# Patient Record
Sex: FEMALE | URBAN - METROPOLITAN AREA
[De-identification: names, ages, dates, MRNs, and addresses within clinical notes are randomized per-mention and may not be internally consistent; named-entity substitution may affect disease eponyms.]

---

## 2021-09-08 ENCOUNTER — APPOINTMENT (OUTPATIENT)
Age: 31
Setting detail: DERMATOLOGY
End: 2021-09-08

## 2021-09-08 DIAGNOSIS — L20.89 OTHER ATOPIC DERMATITIS: ICD-10-CM

## 2021-09-08 DIAGNOSIS — Z71.89 OTHER SPECIFIED COUNSELING: ICD-10-CM

## 2021-09-08 DIAGNOSIS — L85.3 XEROSIS CUTIS: ICD-10-CM

## 2021-09-08 DIAGNOSIS — L50.8 OTHER URTICARIA: ICD-10-CM

## 2021-09-08 PROBLEM — L20.84 INTRINSIC (ALLERGIC) ECZEMA: Status: ACTIVE | Noted: 2021-09-08

## 2021-09-08 PROCEDURE — OTHER RECOMMENDATIONS: OTHER

## 2021-09-08 PROCEDURE — OTHER ITCH-SCRATCH CYCLE COUNSELING: OTHER

## 2021-09-08 PROCEDURE — OTHER MEDICATION COUNSELING: OTHER

## 2021-09-08 PROCEDURE — OTHER SUNSCREEN RECOMMENDATIONS: OTHER

## 2021-09-08 PROCEDURE — OTHER ADDITIONAL NOTES: OTHER

## 2021-09-08 PROCEDURE — OTHER PRESCRIPTION: OTHER

## 2021-09-08 PROCEDURE — OTHER MIPS QUALITY: OTHER

## 2021-09-08 PROCEDURE — OTHER COUNSELING: OTHER

## 2021-09-08 PROCEDURE — OTHER TREATMENT REGIMEN: OTHER

## 2021-09-08 PROCEDURE — 99204 OFFICE O/P NEW MOD 45 MIN: CPT

## 2021-09-08 RX ORDER — TRIAMCINOLONE ACETONIDE 0.25 MG/G
OINTMENT TOPICAL
Qty: 15 | Refills: 0 | Status: ERX | COMMUNITY
Start: 2021-09-08

## 2021-09-08 RX ORDER — PREDNISONE 10 MG/1
TABLET ORAL
Qty: 65 | Refills: 0 | Status: ERX | COMMUNITY
Start: 2021-09-08

## 2021-09-08 RX ORDER — PIMECROLIMUS 10 MG/G
CREAM TOPICAL
Qty: 30 | Refills: 0 | Status: ERX | COMMUNITY
Start: 2021-09-08

## 2021-09-08 RX ORDER — CLOBETASOL PROPIONATE 0.5 MG/G
CREAM TOPICAL
Qty: 45 | Refills: 1 | Status: ERX | COMMUNITY
Start: 2021-09-08

## 2021-09-08 ASSESSMENT — LOCATION ZONE DERM
LOCATION ZONE: ARM
LOCATION ZONE: FACE

## 2021-09-08 ASSESSMENT — LOCATION DETAILED DESCRIPTION DERM
LOCATION DETAILED: INFERIOR MID FOREHEAD
LOCATION DETAILED: RIGHT INFERIOR CENTRAL MALAR CHEEK
LOCATION DETAILED: RIGHT VENTRAL DISTAL FOREARM
LOCATION DETAILED: LEFT VENTRAL DISTAL FOREARM
LOCATION DETAILED: LEFT INFERIOR CENTRAL MALAR CHEEK

## 2021-09-08 ASSESSMENT — LOCATION SIMPLE DESCRIPTION DERM
LOCATION SIMPLE: RIGHT CHEEK
LOCATION SIMPLE: INFERIOR FOREHEAD
LOCATION SIMPLE: LEFT FOREARM
LOCATION SIMPLE: LEFT CHEEK
LOCATION SIMPLE: RIGHT FOREARM

## 2021-09-08 ASSESSMENT — SEVERITY ASSESSMENT 2020: SEVERITY 2020: MODERATE

## 2021-09-08 NOTE — PROCEDURE: TREATMENT REGIMEN
Detail Level: Zone
Initiate Treatment: Elidel, Prednisone, Clobetasol (hands)
Continue Regimen: Triamcinolone (arms/back)

## 2021-09-08 NOTE — PROCEDURE: MIPS QUALITY
Detail Level: Detailed
Quality 110: Preventive Care And Screening: Influenza Immunization: Influenza Immunization Administered during Influenza season
Quality 226: Preventive Care And Screening: Tobacco Use: Screening And Cessation Intervention: Patient screened for tobacco use and is an ex/non-smoker
Quality 130: Documentation Of Current Medications In The Medical Record: Current Medications Documented
Quality 431: Preventive Care And Screening: Unhealthy Alcohol Use - Screening: Patient not identified as an unhealthy alcohol user when screened for unhealthy alcohol use using a systematic screening method

## 2021-09-08 NOTE — PROCEDURE: ADDITIONAL NOTES
Render Risk Assessment In Note?: no
Additional Notes: 9/8/2021-suspect eczema with urticaria secondary to allergen trigger, encouraged pt on getting allergy testing (gave her names of a few local allergist), ? Irritant contact derm on face secondary to burning of poison ivy vs unknown trigger. Advised that pigment changes are due to inflammation in skin and application of topical steroid especially with chronic sun exposure (reports she walks daily). Advised on the importance of sunscreen daily-zinc based only, only free and clear products such as vanicream, antihistamine daily. Advised if she gets worse or she develops difficulty breathing she needs to go to ER stat.
Detail Level: Simple
Additional Notes: Advised patient to get a referral to allergy for allergy testing

## 2021-09-08 NOTE — HPI: SECONDARY COMPLAINT
How Severe Is This Condition?: moderate
Additional History: Patient presents today for rash that’s been present almost 1 month. Patient has a hx of eczema for several years (reports no known allergens-never had allergy testing). Patient states her face became red, itchy, flaky with swelling to her eyes. She reports she was seen at an urgent care and was given topical steroid (she reports she doesn’t use because it doesn’t work) and 6 days of oral steroids. Patient states nothing seems to be helping and it seems to be getting worse. Patient reports no new products, medications or recent illnesses.

## 2021-09-08 NOTE — PROCEDURE: RECOMMENDATIONS
Render Risk Assessment In Note?: no
Detail Level: Zone
Recommendation Override: Vanicream only, free and clear products
Recommendation Preamble: The following recommendations were made during the visit: Aquaphor, eucerin advanced repair, vanicream

## 2021-09-08 NOTE — PROCEDURE: MEDICATION COUNSELING
Xelbolivarz Pregnancy And Lactation Text: This medication is Pregnancy Category D and is not considered safe during pregnancy.  The risk during breast feeding is also uncertain.

## 2021-10-27 ENCOUNTER — APPOINTMENT (OUTPATIENT)
Age: 31
Setting detail: DERMATOLOGY
End: 2021-10-27

## 2021-10-27 DIAGNOSIS — L50.8 OTHER URTICARIA: ICD-10-CM

## 2021-10-27 DIAGNOSIS — Z71.89 OTHER SPECIFIED COUNSELING: ICD-10-CM

## 2021-10-27 DIAGNOSIS — L20.89 OTHER ATOPIC DERMATITIS: ICD-10-CM

## 2021-10-27 DIAGNOSIS — L85.3 XEROSIS CUTIS: ICD-10-CM

## 2021-10-27 DIAGNOSIS — L30.1 DYSHIDROSIS [POMPHOLYX]: ICD-10-CM

## 2021-10-27 PROBLEM — L20.84 INTRINSIC (ALLERGIC) ECZEMA: Status: ACTIVE | Noted: 2021-10-27

## 2021-10-27 PROCEDURE — OTHER COUNSELING: OTHER

## 2021-10-27 PROCEDURE — OTHER ADDITIONAL NOTES: OTHER

## 2021-10-27 PROCEDURE — OTHER MIPS QUALITY: OTHER

## 2021-10-27 PROCEDURE — 99213 OFFICE O/P EST LOW 20 MIN: CPT

## 2021-10-27 PROCEDURE — OTHER TREATMENT REGIMEN: OTHER

## 2021-10-27 PROCEDURE — OTHER MEDICATION COUNSELING: OTHER

## 2021-10-27 PROCEDURE — OTHER RECOMMENDATIONS: OTHER

## 2021-10-27 PROCEDURE — OTHER ITCH-SCRATCH CYCLE COUNSELING: OTHER

## 2021-10-27 PROCEDURE — OTHER SUNSCREEN RECOMMENDATIONS: OTHER

## 2021-10-27 ASSESSMENT — LOCATION SIMPLE DESCRIPTION DERM
LOCATION SIMPLE: INFERIOR FOREHEAD
LOCATION SIMPLE: RIGHT CHEEK
LOCATION SIMPLE: RIGHT FOREARM
LOCATION SIMPLE: RIGHT HAND
LOCATION SIMPLE: LEFT CHEEK
LOCATION SIMPLE: LEFT FOREARM
LOCATION SIMPLE: LEFT HAND

## 2021-10-27 ASSESSMENT — LOCATION ZONE DERM
LOCATION ZONE: FACE
LOCATION ZONE: ARM
LOCATION ZONE: HAND

## 2021-10-27 ASSESSMENT — SEVERITY ASSESSMENT 2020: SEVERITY 2020: ALMOST CLEAR

## 2021-10-27 ASSESSMENT — LOCATION DETAILED DESCRIPTION DERM
LOCATION DETAILED: LEFT VENTRAL DISTAL FOREARM
LOCATION DETAILED: RIGHT VENTRAL DISTAL FOREARM
LOCATION DETAILED: LEFT THENAR EMINENCE
LOCATION DETAILED: LEFT INFERIOR CENTRAL MALAR CHEEK
LOCATION DETAILED: RIGHT INFERIOR CENTRAL MALAR CHEEK
LOCATION DETAILED: INFERIOR MID FOREHEAD
LOCATION DETAILED: RIGHT THENAR EMINENCE

## 2021-10-27 NOTE — PROCEDURE: ADDITIONAL NOTES
Detail Level: Simple
Render Risk Assessment In Note?: no
Additional Notes: Advised patient to get a referral to allergy for allergy testing
Additional Notes: 9/8/2021-suspect eczema with urticaria secondary to allergen trigger, encouraged pt on getting allergy testing (gave her names of a few local allergist), ? Irritant contact derm on face secondary to burning of poison ivy vs unknown trigger. Advised that pigment changes are due to inflammation in skin and application of topical steroid especially with chronic sun exposure (reports she walks daily). Advised on the importance of sunscreen daily-zinc based only, only free and clear products such as vanicream, antihistamine daily. Advised if she gets worse or she develops difficulty breathing she needs to go to ER stat.\\n\\n10/27-much improvement today, pt has been off oral prednisone for at least 2 weeks with no worse flare. Has what looks like dyshidrosis on hands with mild-moderate hyperhidrosis. Advised to apply certain Dri to palms night, wash off in AM.

## 2021-10-27 NOTE — PROCEDURE: TREATMENT REGIMEN
Continue Regimen: Triamcinolone (arms/back)\\nElidel for face\\nClobetasol (hands)
Detail Level: Zone

## 2021-10-27 NOTE — PROCEDURE: MEDICATION COUNSELING
Isotretinoin Pregnancy And Lactation Text: This medication is Pregnancy Category X and is considered extremely dangerous during pregnancy. It is unknown if it is excreted in breast milk. no

## 2021-10-27 NOTE — PROCEDURE: MEDICATION COUNSELING
No complaints Colchicine Counseling:  Patient counseled regarding adverse effects including but not limited to stomach upset (nausea, vomiting, stomach pain, or diarrhea).  Patient instructed to limit alcohol consumption while taking this medication.  Colchicine may reduce blood counts especially with prolonged use.  The patient understands that monitoring of kidney function and blood counts may be required, especially at baseline. The patient verbalized understanding of the proper use and possible adverse effects of colchicine.  All of the patient's questions and concerns were addressed.

## 2021-10-27 NOTE — PROCEDURE: MIPS QUALITY
Quality 110: Preventive Care And Screening: Influenza Immunization: Influenza Immunization Administered during Influenza season
Quality 431: Preventive Care And Screening: Unhealthy Alcohol Use - Screening: Patient not identified as an unhealthy alcohol user when screened for unhealthy alcohol use using a systematic screening method
Detail Level: Detailed
Quality 226: Preventive Care And Screening: Tobacco Use: Screening And Cessation Intervention: Patient screened for tobacco use and is an ex/non-smoker
Quality 130: Documentation Of Current Medications In The Medical Record: Current Medications Documented

## 2022-04-27 ENCOUNTER — APPOINTMENT (OUTPATIENT)
Dept: URBAN - METROPOLITAN AREA CLINIC 279 | Age: 32
Setting detail: DERMATOLOGY
End: 2022-04-27

## 2022-04-27 DIAGNOSIS — L50.8 OTHER URTICARIA: ICD-10-CM

## 2022-04-27 DIAGNOSIS — L30.1 DYSHIDROSIS [POMPHOLYX]: ICD-10-CM

## 2022-04-27 DIAGNOSIS — L85.3 XEROSIS CUTIS: ICD-10-CM

## 2022-04-27 DIAGNOSIS — Z71.89 OTHER SPECIFIED COUNSELING: ICD-10-CM

## 2022-04-27 DIAGNOSIS — L20.89 OTHER ATOPIC DERMATITIS: ICD-10-CM

## 2022-04-27 PROBLEM — L20.84 INTRINSIC (ALLERGIC) ECZEMA: Status: ACTIVE | Noted: 2022-04-27

## 2022-04-27 PROCEDURE — OTHER TREATMENT REGIMEN: OTHER

## 2022-04-27 PROCEDURE — OTHER ITCH-SCRATCH CYCLE COUNSELING: OTHER

## 2022-04-27 PROCEDURE — OTHER SUNSCREEN RECOMMENDATIONS: OTHER

## 2022-04-27 PROCEDURE — OTHER COUNSELING: OTHER

## 2022-04-27 PROCEDURE — OTHER MIPS QUALITY: OTHER

## 2022-04-27 PROCEDURE — OTHER ADDITIONAL NOTES: OTHER

## 2022-04-27 PROCEDURE — 99213 OFFICE O/P EST LOW 20 MIN: CPT

## 2022-04-27 ASSESSMENT — LOCATION DETAILED DESCRIPTION DERM
LOCATION DETAILED: LEFT INFERIOR CENTRAL MALAR CHEEK
LOCATION DETAILED: RIGHT VENTRAL DISTAL FOREARM
LOCATION DETAILED: LEFT VENTRAL DISTAL FOREARM
LOCATION DETAILED: RIGHT INFERIOR CENTRAL MALAR CHEEK
LOCATION DETAILED: LEFT THENAR EMINENCE
LOCATION DETAILED: RIGHT THENAR EMINENCE
LOCATION DETAILED: INFERIOR MID FOREHEAD

## 2022-04-27 ASSESSMENT — LOCATION SIMPLE DESCRIPTION DERM
LOCATION SIMPLE: LEFT FOREARM
LOCATION SIMPLE: LEFT HAND
LOCATION SIMPLE: RIGHT CHEEK
LOCATION SIMPLE: RIGHT HAND
LOCATION SIMPLE: INFERIOR FOREHEAD
LOCATION SIMPLE: RIGHT FOREARM
LOCATION SIMPLE: LEFT CHEEK

## 2022-04-27 ASSESSMENT — PAIN INTENSITY VAS: HOW INTENSE IS YOUR PAIN 0 BEING NO PAIN, 10 BEING THE MOST SEVERE PAIN POSSIBLE?: NO PAIN

## 2022-04-27 ASSESSMENT — LOCATION ZONE DERM
LOCATION ZONE: FACE
LOCATION ZONE: ARM
LOCATION ZONE: HAND

## 2022-04-27 ASSESSMENT — SEVERITY ASSESSMENT: SEVERITY: CLEAR

## 2022-04-27 ASSESSMENT — SEVERITY ASSESSMENT 2020: SEVERITY 2020: ALMOST CLEAR

## 2022-04-27 NOTE — PROCEDURE: TREATMENT REGIMEN
Otc Regimen: Free and clear products
Detail Level: Zone
Continue Regimen: Triamcinolone (arms/back)\\nElidel for face\\nClobetasol (hands)

## 2022-04-27 NOTE — PROCEDURE: ADDITIONAL NOTES
Render Risk Assessment In Note?: no
Additional Notes: 9/8/2021-suspect eczema with urticaria secondary to allergen trigger, encouraged pt on getting allergy testing (gave her names of a few local allergist), ? Irritant contact derm on face secondary to burning of poison ivy vs unknown trigger. Advised that pigment changes are due to inflammation in skin and application of topical steroid especially with chronic sun exposure (reports she walks daily). Advised on the importance of sunscreen daily-zinc based only, only free and clear products such as vanicream, antihistamine daily. Advised if she gets worse or she develops difficulty breathing she needs to go to ER stat.\\n\\n10/27-much improvement today, pt has been off oral prednisone for at least 2 weeks with no worse flare. Has what looks like dyshidrosis on hands with mild-moderate hyperhidrosis. Advised to apply certain Dri to palms night, wash off in AM.\\n\\n04/27/22-continues to have improvement, still some itching to axilla, more with sweating and running, 2 small little patches on face that are almost gone today
Detail Level: Simple

## 2022-09-28 ENCOUNTER — APPOINTMENT (OUTPATIENT)
Dept: URBAN - METROPOLITAN AREA CLINIC 279 | Age: 32
Setting detail: DERMATOLOGY
End: 2022-10-06

## 2022-09-28 DIAGNOSIS — L30.1 DYSHIDROSIS [POMPHOLYX]: ICD-10-CM

## 2022-09-28 PROCEDURE — OTHER COUNSELING: OTHER

## 2022-09-28 PROCEDURE — 99213 OFFICE O/P EST LOW 20 MIN: CPT

## 2022-09-28 PROCEDURE — OTHER TREATMENT REGIMEN: OTHER

## 2022-09-28 PROCEDURE — OTHER PRESCRIPTION: OTHER

## 2022-09-28 PROCEDURE — OTHER MIPS QUALITY: OTHER

## 2022-09-28 RX ORDER — BETAMETHASONE DIPROPIONATE 0.5 MG/G
OINTMENT TOPICAL
Qty: 45 | Refills: 0 | Status: ERX | COMMUNITY
Start: 2022-09-28

## 2022-09-28 RX ORDER — KETOCONAZOLE 20 MG/G
CREAM TOPICAL
Qty: 60 | Refills: 0 | Status: ERX | COMMUNITY
Start: 2022-09-28

## 2022-09-28 ASSESSMENT — LOCATION DETAILED DESCRIPTION DERM
LOCATION DETAILED: RIGHT THENAR EMINENCE
LOCATION DETAILED: LEFT THENAR EMINENCE

## 2022-09-28 ASSESSMENT — LOCATION SIMPLE DESCRIPTION DERM
LOCATION SIMPLE: LEFT HAND
LOCATION SIMPLE: RIGHT HAND

## 2022-09-28 ASSESSMENT — LOCATION ZONE DERM: LOCATION ZONE: HAND

## 2022-09-28 NOTE — PROCEDURE: TREATMENT REGIMEN
Plan: Use fragrance free hypoallergenic emollient after hand washing. At night apply Betamethasone and cover with Nitrile gloves.
Discontinue Regimen: Clobetasol cream
Detail Level: Zone
Initiate Treatment: Betamethasone ointment and ketoconazole cream

## 2023-01-16 ENCOUNTER — RX ONLY (RX ONLY)
Age: 33
End: 2023-01-16

## 2023-01-16 ENCOUNTER — APPOINTMENT (OUTPATIENT)
Dept: URBAN - METROPOLITAN AREA CLINIC 279 | Age: 33
Setting detail: DERMATOLOGY
End: 2023-03-13

## 2023-01-16 DIAGNOSIS — L30.1 DYSHIDROSIS [POMPHOLYX]: ICD-10-CM

## 2023-01-16 PROCEDURE — OTHER COUNSELING: OTHER

## 2023-01-16 PROCEDURE — OTHER MIPS QUALITY: OTHER

## 2023-01-16 PROCEDURE — 99213 OFFICE O/P EST LOW 20 MIN: CPT

## 2023-01-16 PROCEDURE — OTHER TREATMENT REGIMEN: OTHER

## 2023-01-16 RX ORDER — BETAMETHASONE DIPROPIONATE 0.5 MG/G
OINTMENT TOPICAL
Qty: 45 | Refills: 0 | Status: ERX

## 2023-01-16 ASSESSMENT — LOCATION SIMPLE DESCRIPTION DERM
LOCATION SIMPLE: LEFT HAND
LOCATION SIMPLE: RIGHT HAND

## 2023-01-16 ASSESSMENT — LOCATION DETAILED DESCRIPTION DERM
LOCATION DETAILED: RIGHT THENAR EMINENCE
LOCATION DETAILED: LEFT THENAR EMINENCE

## 2023-01-16 ASSESSMENT — LOCATION ZONE DERM: LOCATION ZONE: HAND

## 2023-01-16 NOTE — PROCEDURE: TREATMENT REGIMEN
Detail Level: Zone
Plan: Use fragrance free hypoallergenic emollient after hand washing. At night apply Betamethasone and cover with Nitrile gloves. Pt will avoid all hand sanitizers and wipes
Discontinue Regimen: Ketoconazole cream. Pt did not use since Betamethasone improved condition
Continue Regimen: Betamethasone ointment. Pt will be more aggressive with topical application to avoid systemic treatments that can be immunosuppressive.

## 2023-02-16 ENCOUNTER — APPOINTMENT (OUTPATIENT)
Dept: URBAN - METROPOLITAN AREA CLINIC 279 | Age: 33
Setting detail: DERMATOLOGY
End: 2023-03-13

## 2023-02-16 DIAGNOSIS — L30.1 DYSHIDROSIS [POMPHOLYX]: ICD-10-CM

## 2023-02-16 PROCEDURE — OTHER ADDITIONAL NOTES: OTHER

## 2023-02-16 PROCEDURE — OTHER COUNSELING: OTHER

## 2023-02-16 PROCEDURE — 99213 OFFICE O/P EST LOW 20 MIN: CPT

## 2023-02-16 PROCEDURE — OTHER MIPS QUALITY: OTHER

## 2023-02-16 PROCEDURE — OTHER TREATMENT REGIMEN: OTHER

## 2023-02-16 NOTE — PROCEDURE: ADDITIONAL NOTES
Ximena Maddox RN 7/14/2021 7:13 AM CDT      ----- Message -----  From: Gracy Hernandez  Sent: 7/13/2021 8:08 PM CDT  To: , *  Subject: Medication Question     Hello!     I wanted to wait a couple days after I finished the course to respond, because sometimes an antibiotic I haven't tried before clears up the symptoms for a few days and then they ramp back up again, which is the case again. I'm back to having frequency and dysuria. I know it's a medication that passes through breast milk, but the last time I had a UTI, I did a course of cipro and that knocked it out. I also had to take the diflucan after the keflex course, so would it be possible to do a course of cipro and a PRN dose of diflucan?     Thank you,  Gracy    
Render Risk Assessment In Note?: no
Detail Level: Simple
Additional Notes: Patient will follow up as needed, and does not need a refill at this time.

## 2023-02-16 NOTE — PROCEDURE: TREATMENT REGIMEN
Detail Level: Zone
Continue Regimen: Betamethasone ointment
Otc Regimen: Continue instructed to use thick, bland, fragrance free, hypoallergenic emollient 3x a day on the body and on hands every morning, after every hand washing, and every night. Will discontinue all hand sanitizers, foams, and cleansing wipes. Gentle cleansers and detergents only

## 2023-05-08 ENCOUNTER — APPOINTMENT (OUTPATIENT)
Dept: URBAN - METROPOLITAN AREA CLINIC 279 | Age: 33
Setting detail: DERMATOLOGY
End: 2023-07-06

## 2023-05-08 DIAGNOSIS — L30.1 DYSHIDROSIS [POMPHOLYX]: ICD-10-CM

## 2023-05-08 PROCEDURE — OTHER MEDICATION COUNSELING: OTHER

## 2023-05-08 PROCEDURE — OTHER COUNSELING: OTHER

## 2023-05-08 PROCEDURE — OTHER MIPS QUALITY: OTHER

## 2023-05-08 PROCEDURE — 99213 OFFICE O/P EST LOW 20 MIN: CPT

## 2023-05-08 PROCEDURE — OTHER TREATMENT REGIMEN: OTHER

## 2023-05-08 PROCEDURE — OTHER ADDITIONAL NOTES: OTHER

## 2023-05-08 NOTE — HPI: RASH
What Type Of Note Output Would You Prefer (Optional)?: Standard Output
Is The Patient Presenting As Previously Scheduled?: Yes
Is This A New Presentation, Or A Follow-Up?: Follow Up Rash
Additional History: Patient states that it is stable and it has not improved.

## 2023-05-08 NOTE — PROCEDURE: TREATMENT REGIMEN
Plan: Patient encouraged to try different modalities to avoid excessive hand sweating or mechanical abrasion.
Continue Regimen: Betamethasone ointment
Detail Level: Zone

## 2023-05-08 NOTE — PROCEDURE: ADDITIONAL NOTES
Render Risk Assessment In Note?: no
Detail Level: Simple
Additional Notes: Patient states that she is active and runs with her children in a stroller. She feels that holding the stroller and sweating exacerbates her symptoms.\\n\\nPatient is not experiencing flares, she made this appointment a year ago and decided to keep this appointment as a follow up. Patient does not need refills at this time.

## 2025-03-05 NOTE — PROCEDURE: MEDICATION COUNSELING
"Patient's mom called wanting to schedule an appt because patient has a \"swollen optic neve\" I informed patients mom I could try fitting in patient but Dr. Green is booking out to mid-May. Patients mom said \"even in situations like this?\" I informed patients mom that I could schedule the appt but without a referral in hand we can't review the referral to see how urgently they need to be seen. Patients mom became upset and stated \"isn't she a female doctor?\" I advised patients mom that Dr. Green is a male doctor and she stated \"Well my daughter was referred over to Dr. Edith Peace.\" I then let them know that Dr. Peace unfortunately doesn't see pediatric patients, she then replied with \"Well, what do you suggest I do?\" I advised her we could schedule the appt and she could reach out to the referring office to send over the referral as urgent to Dr. Green or if patients symptoms are worsening she could bring her to the ER at [facility] because they have an on call ophthalmologist. Patients mom hung up, called back I asked if I could place her on hold as I was on the phone with another pt, pts mom agreed and then hung up. She then called back again and stated \"were you the person that hung up on me twice just now?\" Informed patients mom that I asked she hold and she agreed but proceeded to end the call. Then patients mom said \"So what should I do? Do I need to wait or  can we schedule an appt?\" I then told pts mom we could schedule appt but that if symptoms are worsening to bring patient in to the ER due to Dr. Green being out of office.\" Patient is scheduled for 03/24/25 @ 8 AM.   " Solaraze Counseling:  I discussed with the patient the risks of Solaraze including but not limited to erythema, scaling, itching, weeping, crusting, and pain.